# Patient Record
Sex: MALE | Race: WHITE | NOT HISPANIC OR LATINO | Employment: FULL TIME | URBAN - METROPOLITAN AREA
[De-identification: names, ages, dates, MRNs, and addresses within clinical notes are randomized per-mention and may not be internally consistent; named-entity substitution may affect disease eponyms.]

---

## 2018-06-27 ENCOUNTER — HOSPITAL ENCOUNTER (EMERGENCY)
Facility: MEDICAL CENTER | Age: 28
End: 2018-06-27
Attending: EMERGENCY MEDICINE
Payer: COMMERCIAL

## 2018-06-27 VITALS
HEIGHT: 70 IN | DIASTOLIC BLOOD PRESSURE: 58 MMHG | HEART RATE: 84 BPM | RESPIRATION RATE: 18 BRPM | SYSTOLIC BLOOD PRESSURE: 136 MMHG | WEIGHT: 149.91 LBS | TEMPERATURE: 98.4 F | BODY MASS INDEX: 21.46 KG/M2 | OXYGEN SATURATION: 99 %

## 2018-06-27 DIAGNOSIS — S01.21XA NASAL LACERATION, INITIAL ENCOUNTER: ICD-10-CM

## 2018-06-27 PROCEDURE — 304999 HCHG REPAIR-SIMPLE/INTERMED LEVEL 1

## 2018-06-27 PROCEDURE — A9270 NON-COVERED ITEM OR SERVICE: HCPCS | Performed by: EMERGENCY MEDICINE

## 2018-06-27 PROCEDURE — 303747 HCHG EXTRA SUTURE

## 2018-06-27 PROCEDURE — 99284 EMERGENCY DEPT VISIT MOD MDM: CPT

## 2018-06-27 PROCEDURE — 700111 HCHG RX REV CODE 636 W/ 250 OVERRIDE (IP)

## 2018-06-27 PROCEDURE — 700102 HCHG RX REV CODE 250 W/ 637 OVERRIDE(OP): Performed by: EMERGENCY MEDICINE

## 2018-06-27 PROCEDURE — 304217 HCHG IRRIGATION SYSTEM

## 2018-06-27 RX ORDER — CEPHALEXIN 500 MG/1
500 CAPSULE ORAL 3 TIMES DAILY
Qty: 30 CAP | Refills: 0 | Status: SHIPPED | OUTPATIENT
Start: 2018-06-27 | End: 2018-07-07

## 2018-06-27 RX ORDER — LIDOCAINE HYDROCHLORIDE 10 MG/ML
INJECTION, SOLUTION EPIDURAL; INFILTRATION; INTRACAUDAL; PERINEURAL
Status: COMPLETED
Start: 2018-06-27 | End: 2018-06-27

## 2018-06-27 RX ORDER — HYDROCODONE BITARTRATE AND ACETAMINOPHEN 5; 325 MG/1; MG/1
1 TABLET ORAL EVERY 4 HOURS PRN
Qty: 8 TAB | Refills: 0 | Status: SHIPPED | OUTPATIENT
Start: 2018-06-27 | End: 2018-06-29 | Stop reason: SDUPTHER

## 2018-06-27 RX ORDER — LIDOCAINE HYDROCHLORIDE AND EPINEPHRINE 10; 10 MG/ML; UG/ML
INJECTION, SOLUTION INFILTRATION; PERINEURAL
Status: DISCONTINUED
Start: 2018-06-27 | End: 2018-06-27 | Stop reason: HOSPADM

## 2018-06-27 RX ORDER — HYDROCODONE BITARTRATE AND ACETAMINOPHEN 5; 325 MG/1; MG/1
1 TABLET ORAL ONCE
Status: COMPLETED | OUTPATIENT
Start: 2018-06-27 | End: 2018-06-27

## 2018-06-27 RX ORDER — LIDOCAINE HYDROCHLORIDE 10 MG/ML
20 INJECTION, SOLUTION INFILTRATION; PERINEURAL ONCE
Status: COMPLETED | OUTPATIENT
Start: 2018-06-27 | End: 2018-06-27

## 2018-06-27 RX ADMIN — LIDOCAINE HYDROCHLORIDE 20 ML: 10 INJECTION, SOLUTION INFILTRATION; PERINEURAL at 11:30

## 2018-06-27 RX ADMIN — LIDOCAINE HYDROCHLORIDE 20 ML: 10 INJECTION, SOLUTION EPIDURAL; INFILTRATION; INTRACAUDAL; PERINEURAL at 11:30

## 2018-06-27 RX ADMIN — HYDROCODONE BITARTRATE AND ACETAMINOPHEN 1 TABLET: 5; 325 TABLET ORAL at 11:23

## 2018-06-27 ASSESSMENT — PAIN SCALES - GENERAL
PAINLEVEL_OUTOF10: 7
PAINLEVEL_OUTOF10: 7

## 2018-06-27 NOTE — ED PROVIDER NOTES
ED Provider Note    Scribed for Hailey Ray M.D. by Jennifer Hazel. 6/27/2018  11:15 AM    Primary care provider: Jackelin Llanos M.D.  Means of arrival: Walk-in  History obtained from: Patient  History limited by: None    CHIEF COMPLAINT  Chief Complaint   Patient presents with   • Laceration   • Facial Injury       HPI  Hesham Rich is a 28 y.o. male who presents to the Emergency Department for evaluation of laceration to left side nose. Patient sustained the injury after a garage door fell on his face. He denies loss of consciousness or any loose teeth. He states his nose does not feel broken.     REVIEW OF SYSTEMS  HEENT:  No loose teeth, facial injury  SKIN: laceration to left nose    See history of present illness. E.    PAST MEDICAL HISTORY       SURGICAL HISTORY   has a past surgical history that includes interdental fixation (7/9/08); laceration repair (7/9/08); dental surgery (7/9/08); dental extraction(s) (06/2008); and arch bar removal or repair (8/21/08).    SOCIAL HISTORY  Social History   Substance Use Topics   • Smoking status: Never Smoker   • Alcohol use No      History   Drug Use No       FAMILY HISTORY  Family History   Problem Relation Age of Onset   • Diabetes Maternal Grandfather        CURRENT MEDICATIONS    Current Facility-Administered Medications:   •  LIDOCAINE-EPINEPHRINE 1 %-1:122002 INJ SOLN, , , ,     Current Outpatient Prescriptions:   •  cephALEXin (KEFLEX) 500 MG Cap, Take 1 Cap by mouth 3 times a day for 10 days., Disp: 30 Cap, Rfl: 0  •  HYDROcodone-acetaminophen (NORCO) 5-325 MG Tab per tablet, Take 1 Tab by mouth every four hours as needed for up to 3 days., Disp: 8 Tab, Rfl: 0  •  sertraline (ZOLOFT) 50 MG TABS, Take 50 mg by mouth every day., Disp: , Rfl:   •  Eszopiclone (LUNESTA) 2 MG TABS, Take  by mouth., Disp: , Rfl:   •  erythromycin 5 MG/GM OINT, Apply to affected eyelid tid x 7 days., Disp: 1 Tube, Rfl: 0    ALLERGIES  No Known Allergies    PHYSICAL  "EXAM  VITAL SIGNS: /74   Pulse 66   Temp 36.9 °C (98.4 °F) (Temporal)   Resp 16   Ht 1.778 m (5' 10\")   Wt 68 kg (149 lb 14.6 oz)   SpO2 100%   BMI 21.51 kg/m²     Constitutional: Well developed, Well nourished, No acute distress, Non-toxic appearance.   HEENT: Normocephalic, external ears normal, pharynx pink,  Mucous  Membranes moist, No rhinorrhea or mucosal edema  Eyes: PERRL, EOMI, Conjunctiva normal, No discharge.   Neck: Normal range of motion, No tenderness, Supple, No stridor.   Cardiovascular: Regular Rate and Rhythm, No murmurs,  rubs, or gallops.   Thorax & Lungs: Lungs clear to auscultation bilaterally, No respiratory distress, No wheezes, rhales or rhonchi, No chest wall tenderness.   Abdomen: Bowel sounds normal, Soft, non tender, non distended,  No pulsatile masses., no rebound guarding or peritoneal signs.   Skin: 2.5 cm laceration to left side nose  Extremities: Equal, intact distal pulses, No cyanosis, No tenderness.   Neurologic: Alert & awake, no focal deficits      DIAGNOSTIC STUDIES / PROCEDURES    Laceration Repair Procedure Note    Indication: Laceration    Procedure: The patient was placed in the appropriate position and anesthesia around the laceration was obtained by infiltration using 1% Lidocaine without epinephrine. The area was then irrigated with normal saline. The laceration was closed with 6-0 Ethilon using interrupted sutures. There were no additional lacerations requiring repair. The wound area was then dressed with a sterile dressing.      Total repaired wound length: 2.5 cm.     Other Items: Suture count: 10    The patient tolerated the procedure well.    Complications: None      COURSE & MEDICAL DECISION MAKING  Nursing notes, VS, PMSFHx reviewed in chart.     11:15 AM - Patient seen and examined at bedside. Patient has a sutureable laceration on his left side nose. He agrees with plan to repair laceration. Patient will be treated with Norco 5-325 mg.     11:30 AM " - Laceration repair procedure performed. See above note for details. Workman's Comp will consult on patient. The patient will be discharged with Keflex, Norco and should return if symptoms worsen or if new symptoms arise. The patient understands and agrees to plan.      reviewed prescription monitoring program for patient's narcotic use before prescribing a scheduled drug.The patient will not drink alcohol nor drive with prescribed medications. The patient will return for new or worsening symptoms and is stable at the time of discharge.    In prescribing controlled substances to this patient, I certify that I have obtained and reviewed the medical history of Hesham Rich. I have also made a good lenore effort to obtain applicable records from other providers who have treated the patient and records did not demonstrate any increased risk of substance abuse that would prevent me from prescribing controlled substances.     I have conducted a physical exam and documented it. I have reviewed Mr. Rich’s prescription history as maintained by the [place] Prescription Monitoring Program.     I have assessed the patient’s risk for abuse, dependency, and addiction using the validated Opioid Risk Tool available at https://www.mdcalc.com/dxcmzd-nmdx-ackx-ort-narcotic-abuse.     Given the above, I believe the benefits of controlled substance therapy outweigh the risks. The reasons for prescribing controlled substances include non-narcotic, oral analgesic alternatives have been inadequate for pain control. Accordingly, I have discussed the risk and benefits, treatment plan, and alternative therapies with the patient.       DISPOSITION:  Patient will be discharged home in stable condition.    FOLLOW UP:  [address] [address]  [phone]    Call in 1 day  for recheck and have sutures removed in 5 days      OUTPATIENT MEDICATIONS:  Discharge Medication List as of 6/27/2018 12:50 PM       START taking these medications    Details   cephALEXin (KEFLEX) 500 MG Cap Take 1 Cap by mouth 3 times a day for 10 days., Disp-30 Cap, R-0, Print Rx Paper      HYDROcodone-acetaminophen (NORCO) 5-325 MG Tab per tablet Take 1 Tab by mouth every four hours as needed for up to 3 days., Disp-8 Tab, R-0, Print Rx Paper, For 3 days             FINAL IMPRESSION  1. Nasal laceration, initial encounter          IJennifer (Kaylaibe), am scribing for, and in the presence of, Hailey Ray M.D..    Electronically signed by: Jennifer Hazel (Kaylaibe), 6/27/2018    IHailey M.D. personally performed the services described in this documentation, as scribed by Jennifer Hazel in my presence, and it is both accurate and complete.    The note accurately reflects work and decisions made by me.  Hailey Ray  6/27/2018  1:17 PM

## 2018-06-27 NOTE — DISCHARGE INSTRUCTIONS
Laceration Care, Adult  A laceration is a cut that goes through all of the layers of the skin and into the tissue that is right under the skin. Some lacerations heal on their own. Others need to be closed with stitches (sutures), staples, skin adhesive strips, or skin glue. Proper laceration care minimizes the risk of infection and helps the laceration to heal better.  HOW TO CARE FOR YOUR LACERATION  If sutures or staples were used:  · Keep the wound clean and dry.  · If you were given a bandage (dressing), you should change it at least one time per day or as told by your health care provider. You should also change it if it becomes wet or dirty.  · Keep the wound completely dry for the first 24 hours or as told by your health care provider. After that time, you may shower or bathe. However, make sure that the wound is not soaked in water until after the sutures or staples have been removed.  · Clean the wound one time each day or as told by your health care provider:  ¨ Wash the wound with soap and water.  ¨ Rinse the wound with water to remove all soap.  ¨ Pat the wound dry with a clean towel. Do not rub the wound.  · After cleaning the wound, apply a thin layer of antibiotic ointment as told by your health care provider. This will help to prevent infection and keep the dressing from sticking to the wound.  · Have the sutures or staples removed as told by your health care provider.  If skin adhesive strips were used:  · Keep the wound clean and dry.  · If you were given a bandage (dressing), you should change it at least one time per day or as told by your health care provider. You should also change it if it becomes dirty or wet.  · Do not get the skin adhesive strips wet. You may shower or bathe, but be careful to keep the wound dry.  · If the wound gets wet, pat it dry with a clean towel. Do not rub the wound.  · Skin adhesive strips fall off on their own. You may trim the strips as the wound heals. Do not  remove skin adhesive strips that are still stuck to the wound. They will fall off in time.  If skin glue was used:  · Try to keep the wound dry, but you may briefly wet it in the shower or bath. Do not soak the wound in water, such as by swimming.  · After you have showered or bathed, gently pat the wound dry with a clean towel. Do not rub the wound.  · Do not do any activities that will make you sweat heavily until the skin glue has fallen off on its own.  · Do not apply liquid, cream, or ointment medicine to the wound while the skin glue is in place. Using those may loosen the film before the wound has healed.  · If you were given a bandage (dressing), you should change it at least one time per day or as told by your health care provider. You should also change it if it becomes dirty or wet.  · If a dressing is placed over the wound, be careful not to apply tape directly over the skin glue. Doing that may cause the glue to be pulled off before the wound has healed.  · Do not pick at the glue. The skin glue usually remains in place for 5-10 days, then it falls off of the skin.  General Instructions  · Take over-the-counter and prescription medicines only as told by your health care provider.  · If you were prescribed an antibiotic medicine or ointment, take or apply it as told by your doctor. Do not stop using it even if your condition improves.  · To help prevent scarring, make sure to cover your wound with sunscreen whenever you are outside after stitches are removed, after adhesive strips are removed, or when glue remains in place and the wound is healed. Make sure to wear a sunscreen of at least 30 SPF.  · Do not scratch or pick at the wound.  · Keep all follow-up visits as told by your health care provider. This is important.  · Check your wound every day for signs of infection. Watch for:  ¨ Redness, swelling, or pain.  ¨ Fluid, blood, or pus.  · Raise (elevate) the injured area above the level of your heart  while you are sitting or lying down, if possible.  SEEK MEDICAL CARE IF:  · You received a tetanus shot and you have swelling, severe pain, redness, or bleeding at the injection site.  · You have a fever.  · A wound that was closed breaks open.  · You notice a bad smell coming from your wound or your dressing.  · You notice something coming out of the wound, such as wood or glass.  · Your pain is not controlled with medicine.  · You have increased redness, swelling, or pain at the site of your wound.  · You have fluid, blood, or pus coming from your wound.  · You notice a change in the color of your skin near your wound.  · You need to change the dressing frequently due to fluid, blood, or pus draining from the wound.  · You develop a new rash.  · You develop numbness around the wound.  SEEK IMMEDIATE MEDICAL CARE IF:  · You develop severe swelling around the wound.  · Your pain suddenly increases and is severe.  · You develop painful lumps near the wound or on skin that is anywhere on your body.  · You have a red streak going away from your wound.  · The wound is on your hand or foot and you cannot properly move a finger or toe.  · The wound is on your hand or foot and you notice that your fingers or toes look pale or bluish.     This information is not intended to replace advice given to you by your health care provider. Make sure you discuss any questions you have with your health care provider.     Document Released: 12/18/2006 Document Revised: 05/03/2016 Document Reviewed: 12/14/2015  [employer] Interactive Patient Education ©2016 [employer]      Facial Laceration  A facial laceration is a cut on the face. These injuries can be painful and cause bleeding. Some cuts may need to be closed with stitches (sutures), skin adhesive strips, or wound glue. Cuts usually heal quickly but can leave a scar. It can take 1-2 years for the scar to go away completely.  Follow these instructions at home:  · Only take medicines  as told by your doctor.  · Follow your doctor's instructions for wound care.  For Stitches:  · Keep the cut clean and dry.  · If you have a bandage (dressing), change it at least once a day. Change the bandage if it gets wet or dirty, or as told by your doctor.  · Wash the cut with soap and water 2 times a day. Rinse the cut with water. Pat it dry with a clean towel.  · Put a thin layer of medicated cream on the cut as told by your doctor.  · You may shower after the first 24 hours. Do not soak the cut in water until the stitches are removed.  · Have your stitches removed as told by your doctor.  · Do not wear any makeup until a few days after your stitches are removed.  For Skin Adhesive Strips:  · Keep the cut clean and dry.  · Do not get the strips wet. You may take a bath, but be careful to keep the cut dry.  · If the cut gets wet, pat it dry with a clean towel.  · The strips will fall off on their own. Do not remove the strips that are still stuck to the cut.  For Wound Glue:  · You may shower or take baths. Do not soak or scrub the cut. Do not swim. Avoid heavy sweating until the glue falls off on its own. After a shower or bath, pat the cut dry with a clean towel.  · Do not put medicine or makeup on your cut until the glue falls off.  · If you have a bandage, do not put tape over the glue.  · Avoid lots of sunlight or tanning lamps until the glue falls off.  · The glue will fall off on its own in 5-10 days. Do not pick at the glue.  After Healing:  · Put sunscreen on the cut for the first year to reduce your scar.  Contact a doctor if:  · You have a fever.  Get help right away if:  · Your cut area gets red, painful, or puffy (swollen).  · You see a yellowish-white fluid (pus) coming from the cut.  This information is not intended to replace advice given to you by your health care provider. Make sure you discuss any questions you have with your health care provider.  Document Released: 06/05/2009 Document  Revised: 05/25/2017 Document Reviewed: 07/31/2014  [employer] Interactive Patient Education © 2017 [employer]

## 2018-06-27 NOTE — ED TRIAGE NOTES
Pt c/o laceration to left side of nose, pt states he was up on ladder working on garage door, door fell on pt's face, pt denies LOC, states some pain in mouth, but teeth are intact, pt states he noticed intermittent blurry vision in left eye, pt states this is work injury, pt awake, alert, appropriate

## 2018-06-27 NOTE — LETTER
"  FORM C-4:  EMPLOYEE’S CLAIM FOR COMPENSATION/ REPORT OF INITIAL TREATMENT  EMPLOYEE’S CLAIM - PROVIDE ALL INFORMATION REQUESTED   First Name  Hesham Rg Last Name  Hilario Birthdate             Age  1990 28 y.o. 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 (if applicable)  Removing garage door    How did this injury or occupational disease occur? Be specific and answer in detail. Use additional sheet if necessary)  was removing door from brackets. door fell out of bracket and onto my face   If you believe that you have an occupational disease, when did you first have knowledge of the disability and it relationship to your employment?  n/a Witnesses to the Accident  n/a     Nature of Injury or Occupational Disease  Workers' Compensation  Part(s) of Body Injured or Affected  Soft Tissue, Facial Bones, N/A    I certify that the above is true and correct to the best of my knowledge and that I have provided this information in order to obtain the benefits of " [place] or [place]).  I hereby authorize any physician, chiropractor, surgeon, practitioner, or other person, any hospital, including [facility] or [facility], any medical service organization, any insurance company, or other institution or organization to release to each other, any medical or other information, including benefits paid or payable, pertinent to this injury or disease, except information relative to diagnosis, treatment and/or counseling for AIDS, psychological conditions, alcohol or controlled substances, for which I must give specific authorization.  A Photostat of this authorization shall be as valid as the original.   Date 06/27/2018 Place  [facility] Employee’s Signature   THIS REPORT MUST BE COMPLETED AND MAILED WITHIN 3 WORKING DAYS OF TREATMENT   Place  [facility], EMERGENCY DEPT  Name of Facility   [facility]   Date  6/27/2018 Diagnosis  (S01.21XA) Nasal laceration, initial encounter Is there evidence the injured employee was under the influence of alcohol and/or another controlled substance at the time of accident?   Hour  12:17 PM Description of Injury or Disease  Nasal laceration, initial encounter No   Treatment  Laceration repair  Have you advised the patient to remain off work five days or more?         No   X-Ray Findings    Comments:na   If Yes   From Date  6/27/2018 To Date  6/29/2018   From information given by the employee, together with medical evidence, can you directly connect this injury or occupational disease as job incurred?  Yes If No, is the employee capable of: Full Duty  No Modified Duty  Yes   Is additional medical care by a physician indicated?  Yes  Comments:workmans comp If Modified Duty, Specify any Limitations / Restrictions  No heavy exertion     Do you know of any previous injury or disease contributing to this  "condition or occupational disease?  No   Date  6/27/2018 Print Doctor’s Name  BrittonHector romeo I certify the employer’s copy of this form was mailed on:   Address  [address] [address] [phone] Insurer’s Use Only   [facility]  [address]    Provider’s Tax ID Number    Telephone  Dept: [phone]    Doctor’s Signature  e-HECTOR Jaffe M.D. Degree   MD    Original - TREATING PHYSICIAN OR CHIROPRACTOR   Pg 2-Insurer/TPA   Pg 3-Employer   Pg 4-Employee                                                                                                  Form C-4 (rev01/03)     BRIEF DESCRIPTION OF RIGHTS AND BENEFITS  (Pursuant to [place] 616C.050)    Notice of Injury or Occupational Disease (Incident Report Form C-1): If an injury or occupational disease (OD) arises out of and in the course of employment, you must provide written notice to your employer as soon as practicable, but no later than 7 days after the accident or OD. Your employer shall maintain a sufficient supply of the required forms.  Claim for Compensation (Form C-4): If medical treatment is sought, the form C-4 is available at the place of initial treatment. A completed \"Claim for Compensation\" (Form C-4) must be filed within 90 days after an accident or OD. The treating physician or chiropractor must, within 3 working days after treatment, complete and mail to the employer, the employer's insurer and third-party , the Claim for Compensation.  Medical Treatment: If you require medical treatment for your on-the-job injury or OD, you may be required to select a physician or chiropractor from a list provided by your workers’ compensation insurer, if it has contracted with an Organization for Managed Care (MCO) or Preferred Provider Organization (PPO) or providers of health care. If your employer has not entered into a contract with an MCO or PPO, you may select a physician or chiropractor from the Panel of " Physicians and Chiropractors. Any medical costs related to your industrial injury or OD will be paid by your insurer.  Temporary Total Disability (TTD): If your doctor has certified that you are unable to work for a period of at least 5 consecutive days, or 5 cumulative days in a 20-day period, or places restrictions on you that your employer does not accommodate, you may be entitled to TTD compensation.  Temporary Partial Disability (TPD): If the wage you receive upon reemployment is less than the compensation for TTD to which you are entitled, the insurer may be required to pay you TPD compensation to make up the difference. TPD can only be paid for a maximum of 24 months.  Permanent Partial Disability (PPD): When your medical condition is stable and there is an indication of a PPD as a result of your injury or OD, within 30 days, your insurer must arrange for an evaluation by a rating physician or chiropractor to determine the degree of your PPD. The amount of your PPD award depends on the date of injury, the results of the PPD evaluation and your age and wage.  Permanent Total Disability (PTD): If you are medically certified by a treating physician or chiropractor as permanently and totally disabled and have been granted a PTD status by your insurer, you are entitled to receive monthly benefits not to exceed 66 2/3% of your average monthly wage. The amount of your PTD payments is subject to reduction if you previously received a PPD award.  Vocational Rehabilitation Services: You may be eligible for vocational rehabilitation services if you are unable to return to the job due to a permanent physical impairment or permanent restrictions as a result of your injury or occupational disease.  Transportation and Per Yumiko Reimbursement: You may be eligible for travel expenses and per yumiko associated with medical treatment.  Reopening: You may be able to reopen your claim if your condition worsens after claim  closure.  Appeal Process: If you disagree with a written determination issued by the insurer or the insurer does not respond to your request, you may appeal to the Department of Administration, , by following the instructions contained in your determination letter. You must appeal the determination within 70 days from the date of the determination letter at [address], [address], [place], [place] [address], or [address], [address], [place], [place] [address]. If you disagree with the  decision, you may appeal to the Department of Administration, . You must file your appeal within 30 days from the date of the  decision letter at [address], [address], [place], [place] [address], or [address], [place], [place] [address]. If you disagree with a decision of an , you may file a petition for judicial review with the District Court. You must do so within 30 days of the Appeal Officer’s decision. You may be represented by an  at your own expense or you may contact the [facility] for possible representation.  [place]  for Injured Workers (NAIW): If you disagree with a  decision, you may request that NAIW represent you without charge at an  Hearing. For information regarding denial of benefits, you may contact the [facility] at: [address], [address], [place] [address], [phone]) [phone], or [address], [address], [place] [address], [phone]  To File a Complaint with the Division: If you wish to file a complaint with the  of the Division of Industrial Relations (DIR), please contact the Workers’ Compensation Section, [address], [address], [place], [place] [address], telephone [phone]) [phone], or [address], [place] [address], telephone [phone].  For assistance with Workers’  Compensation Issues: you may contact the Office of the Governor Consumer Health Assistance, [address] [facility] [address], [address], [place], [place] [address], Toll Free 1-307.383.6193, Web site: http://govcha.[facility].nv., E-mail [email or web address].[email or web address].                                                                                                                                                                               __________________________________________________________________                                    06/27/2018            Employee Name / Signature                                                                                                                            Date                                       D-2 (rev. 10/07)

## 2018-06-27 NOTE — ED NOTES
Patient received discharge instructions, verbalized understanding and intent to follow. Denied any additional questions or concerns. Stable and ambulatory to discharge with steady gait. Belongings with patient at time of discharge.   Wife and daughter at bedside for discharge instructions.

## 2018-06-29 ENCOUNTER — HOSPITAL ENCOUNTER (OUTPATIENT)
Dept: RADIOLOGY | Facility: MEDICAL CENTER | Age: 28
End: 2018-06-29
Attending: FAMILY MEDICINE
Payer: COMMERCIAL

## 2018-06-29 ENCOUNTER — OCCUPATIONAL MEDICINE (OUTPATIENT)
Dept: URGENT CARE | Facility: PHYSICIAN GROUP | Age: 28
End: 2018-06-29
Payer: COMMERCIAL

## 2018-06-29 VITALS
RESPIRATION RATE: 14 BRPM | TEMPERATURE: 99.1 F | WEIGHT: 165 LBS | HEIGHT: 70 IN | SYSTOLIC BLOOD PRESSURE: 122 MMHG | BODY MASS INDEX: 23.62 KG/M2 | HEART RATE: 76 BPM | DIASTOLIC BLOOD PRESSURE: 84 MMHG | OXYGEN SATURATION: 98 %

## 2018-06-29 DIAGNOSIS — S01.21XS NASAL LACERATION, SEQUELA: ICD-10-CM

## 2018-06-29 DIAGNOSIS — S01.21XA LACERATION OF NOSE, INITIAL ENCOUNTER: ICD-10-CM

## 2018-06-29 DIAGNOSIS — S02.2XXA CLOSED FRACTURE OF NASAL BONE, INITIAL ENCOUNTER: ICD-10-CM

## 2018-06-29 PROCEDURE — 70160 X-RAY EXAM OF NASAL BONES: CPT

## 2018-06-29 PROCEDURE — 99213 OFFICE O/P EST LOW 20 MIN: CPT | Performed by: FAMILY MEDICINE

## 2018-06-29 RX ORDER — HYDROCODONE BITARTRATE AND ACETAMINOPHEN 5; 325 MG/1; MG/1
1 TABLET ORAL EVERY 4 HOURS PRN
Qty: 10 TAB | Refills: 0 | Status: SHIPPED | OUTPATIENT
Start: 2018-06-29 | End: 2018-07-06

## 2018-06-29 ASSESSMENT — PAIN SCALES - GENERAL: PAINLEVEL: 7=MODERATE-SEVERE PAIN

## 2018-06-29 NOTE — LETTER
[facility]  [address]  [place] [address]  Phone:  [phone] - Fax:  [phone]   [facility] Progress Report and Disability Certification  Date of Service: 6/29/2018   No Show:  No  Date / Time of Next Visit:     Claim Information   Patient Name: Hesham Rich  Claim Number:     Employer:   *** Date of Injury: 6/27/2018     Insurer / TPA: [employer] *** ID / SSN:     Occupation: Technician *** Diagnosis: The encounter diagnosis was Laceration of nose, initial encounter.    Medical Information   Related to Industrial Injury? Yes ***   Subjective Complaints:    DOI: 6/27    Status post nasal laceration repair done in emergency department, after a garage door fell on him at work.         He comes in today for re-evaluation due to increased pain over nose.   Describes pain as constant, throbbing, and tender to the touch.   Pain slightly improved with norco.   Currently on keflex for infection prevention.   He denies any fever.      Objective Findings: Nose - nasal mucosa swollen on right.   Laceration over right nare is clean, dry and with sutures intact.   There is some edema over bridge of nose, but no signs of wound infection -   No surrounding erythema or increased warmth.   Pre-Existing Condition(s):     Assessment:   Condition Worsened    Status: Discharged / Care Transfer  Permanent Disability:No    Plan: Medication    Diagnostics: X-ray  Comments:xray shows probable nasal bone fracture    Comments:       Disability Information   Status: Released to Full Duty    From:     Through:   Restrictions are:     Physical Restrictions   Sitting:    Standing:    Stooping:    Bending:      Squatting:    Walking:    Climbing:    Pushing:      Pulling:    Other:    Reaching Above Shoulder (L):   Reaching Above Shoulder (R):       Reaching Below Shoulder (L):    Reaching Below Shoulder (R):      Not to exceed Weight Limits   Carrying(hrs):   Weight Limit(lb):    Lifting(hrs):   Weight  Limit(lb):     Comments:   Nasal laceration  -s/p repair, now presents with increased pain    Xray was personally reviewed.   Likely small nasal bone fracture and deviated septum.     Will transfer care to ENT                      Repetitive Actions   Hands: i.e. Fine Manipulations from Grasping:     Feet: i.e. Operating Foot Controls:     Driving / Operate Machinery:     Physician Name: Viet Luna M.D. Physician Signature: VIET Khalil M.D. e-Signature: Dr. Storm Darby, Medical Director   Clinic Name / Location: [facility] [address] [address] Clinic Phone Number: Dept: [phone]   Appointment Time: 11:05 Am Visit Start Time: 11:11 AM   Check-In Time:  11:02 Am Visit Discharge Time:  ***   Original-Treating Physician or Chiropractor    Page 2-Insurer/TPA    Page 3-Employer    Page 4-Employee

## 2018-06-29 NOTE — PROGRESS NOTES
"Chief Complaint   Patient presents with   • Facial Injury     W/C facial injury HAVING SHARP PAIN          HPI:      DOI: 6/27    Status post nasal laceration repair done in emergency department, after a garage door fell on him at work.         He comes in today for re-evaluation due to increased pain over nose.   Describes pain as constant, throbbing, and tender to the touch.   Pain slightly improved with norco.   Currently on keflex for infection prevention.   He denies any fever.       Past medical history was unremarkable and not pertinent to current issue  Social hx - denies tobacco, alcohol, drug use  Family hx was reviewed - no pertinent past family hx        Review of Systems:  Review of Systems   Constitutional: Negative for fever.   HENT: no neck pain, headache or dizziness  Eyes: denies vision changes     Skin: no itching or rash  Neurological: No numbness or tingling.         OBJECTIVE  Blood pressure 122/84, pulse 76, temperature 37.3 °C (99.1 °F), resp. rate 14, height 1.778 m (5' 10\"), weight 74.8 kg (165 lb), SpO2 98 %.    HEENT - PERRLA, EOMI  Nose - nasal mucosa swollen on right.   Laceration over right nare is clean, dry and with sutures intact.   There is some edema over bridge of nose, but no signs of wound infection -   No surrounding erythema or increased warmth.     Neuro - alert and oriented x3. CN 2-12 grossly intact.        6/29/2018 12:44 PM    HISTORY/REASON FOR EXAM:  Trauma.  Laceration    TECHNIQUE/EXAM DESCRIPTION AND NUMBER OF VIEWS:  3 views of the nasal bones.    COMPARISON: None    FINDINGS:  There is minimal leftward deviation of the nasal septum. There is slight contour deformity of the nasal bones with a linear lucency where a nondisplaced fracture is not excluded. No significant soft tissue swelling is seen. Mastoid air cells are aerated.   Orbital rims appear maintained. Nasal spine of the maxilla is intact.   Impression       Slight contour deformity of the nasal bones with " a linear lucency where a nondisplaced fracture is not excluded.    Minimal leftward deviation of the nasal septum.   Reading Provider Reading Date   Adriana Montez M.D. Jun 29, 2018         A/P:         Nasal laceration  -s/p repair, now presents with increased pain    Xray was personally reviewed.   Likely small nasal bone fracture and deviated septum.     Will transfer care to ENT    - HYDROcodone-acetaminophen (NORCO) 5-325 MG Tab per tablet; Take 1 Tab by mouth every four hours as needed for up to 7 days.  Dispense: 10 Tab; Refill: 0

## 2018-06-29 NOTE — LETTER
[facility] [place]  [address] [facility] [address] [place] [address]  Phone:  [phone] - Fax:  [phone]   Occ[facility] Progress Report and Disability Certification  Date of Service: 6/29/2018   No Show:  No  Date / Time of Next Visit:     Claim Information   Patient Name: Hesham Rich  Claim Number:     Employer:   [employer] Date of Injury: 6/27/2018     Insurer / TPA: [employer]  ID / SSN:     Occupation: Technician  Diagnosis: Diagnoses of Closed fracture of nasal bone, initial encounter and Nasal laceration, sequela were pertinent to this visit.    Medical Information   Related to Industrial Injury? Yes    Subjective Complaints:    DOI: 6/27    Status post nasal laceration repair done in emergency department, after a garage door fell on him at work.         He comes in today for re-evaluation due to increased pain over nose.   Describes pain as constant, throbbing, and tender to the touch.   Pain slightly improved with norco.   Currently on keflex for infection prevention.   He denies any fever.      Objective Findings: Nose - nasal mucosa swollen on right.   Laceration over right nare is clean, dry and with sutures intact.   There is some edema over bridge of nose, but no signs of wound infection -   No surrounding erythema or increased warmth.   Pre-Existing Condition(s):     Assessment:   Condition Worsened    Status: Discharged / Care Transfer  Permanent Disability:No    Plan: Medication    Diagnostics: X-ray  Comments:xray shows probable nasal bone fracture    Comments:       Disability Information   Status: Released to Full Duty    From:     Through:   Restrictions are:     Physical Restrictions   Sitting:    Standing:    Stooping:    Bending:      Squatting:    Walking:    Climbing:    Pushing:      Pulling:    Other:    Reaching Above Shoulder (L):   Reaching Above Shoulder (R):       Reaching Below Shoulder (L):    Reaching Below Shoulder (R):      Not  to exceed Weight Limits   Carrying(hrs):   Weight Limit(lb):   Lifting(hrs):   Weight  Limit(lb):     Comments:    Nasal laceration  -s/p repair, now presents with increased pain    Xray was personally reviewed.   Likely small nasal bone fracture and deviated septum.     Will transfer care to ENT    - HYDROcodone-acetaminophen (NORCO) 5-325 MG Tab per tablet; Take 1 Tab by mouth every four hours as needed for up to 7 days.  Dispense: 10 Tab; Refill: 0             Repetitive Actions   Hands: i.e. Fine Manipulations from Grasping:     Feet: i.e. Operating Foot Controls:     Driving / Operate Machinery:     Physician Name: Viet Luna M.D. Physician Signature: VIET Khalil M.D. e-Signature: Dr. Storm Darby, Medical Director   Clinic Name / Location: [address] [address] Clinic Phone Number: Dept: [phone]   Appointment Time: 11:05 Am Visit Start Time: 11:11 AM   Check-In Time:  11:02 Am Visit Discharge Time:  1:45PM   Original-Treating Physician or Chiropractor    Page 2-Insurer/TPA    Page 3-Employer    Page 4-Employee

## 2018-07-02 ENCOUNTER — OFFICE VISIT (OUTPATIENT)
Dept: URGENT CARE | Facility: PHYSICIAN GROUP | Age: 28
End: 2018-07-02
Payer: COMMERCIAL

## 2018-07-02 VITALS
HEART RATE: 68 BPM | DIASTOLIC BLOOD PRESSURE: 80 MMHG | SYSTOLIC BLOOD PRESSURE: 130 MMHG | OXYGEN SATURATION: 95 % | HEIGHT: 70 IN | RESPIRATION RATE: 16 BRPM | TEMPERATURE: 97.4 F | BODY MASS INDEX: 23.62 KG/M2 | WEIGHT: 165 LBS

## 2018-07-02 DIAGNOSIS — S01.21XD LACERATION OF NOSE, SUBSEQUENT ENCOUNTER: ICD-10-CM

## 2018-07-02 PROCEDURE — 99212 OFFICE O/P EST SF 10 MIN: CPT | Performed by: FAMILY MEDICINE

## 2018-07-02 RX ORDER — IBUPROFEN 200 MG
200 TABLET ORAL EVERY 6 HOURS PRN
COMMUNITY
End: 2023-03-29

## 2018-07-02 ASSESSMENT — PAIN SCALES - GENERAL: PAINLEVEL: NO PAIN

## 2018-07-02 NOTE — LETTER
[address]  [place] [address]  Phone:  [phone] - Fax:  [phone]   [facility] Progress Report and Disability Certification  Date of Service: 7/2/2018   No Show:  No  Date / Time of Next Visit:     Claim Information   Patient Name: Hesham Rich  Claim Number:     Employer: OTHER  Date of Injury:      Insurer / TPA: Miscellaneous  ID / SSN:     Occupation:   Diagnosis: The encounter diagnosis was Laceration of nose, subsequent encounter.    Medical Information   Related to Industrial Injury? Yes    Subjective Complaints:  DOI: 6/27 Status post nasal laceration repair done in emergency department, after a garage door fell on him at work.   FU 7/2/2018 well healing laceration with improvement in pain.  Patient denies swelling, numbness.   Objective Findings: Well-healing laceration with sutures embedded into scab.  No erythema, swelling.    Pre-Existing Condition(s):     Assessment:   Condition Improved    Status: Additional Care Required  Permanent Disability:No    Plan:      Diagnostics:      Comments:       Disability Information   Status: Released to Full Duty    From:     Through:   Restrictions are:     Physical Restrictions   Sitting:    Standing:    Stooping:    Bending:      Squatting:    Walking:    Climbing:    Pushing:      Pulling:    Other:    Reaching Above Shoulder (L):   Reaching Above Shoulder (R):       Reaching Below Shoulder (L):    Reaching Below Shoulder (R):      Not to exceed Weight Limits   Carrying(hrs):   Weight Limit(lb):   Lifting(hrs):   Weight  Limit(lb):     Comments:      Repetitive Actions   Hands: i.e. Fine Manipulations from Grasping:     Feet: i.e. Operating Foot Controls:     Driving / Operate Machinery:     Physician Name: [facility] Physician Signature: ALVARADO Wilson M.D. e-Signature: Dr. Storm Darby, Medical Director   Clinic Name / Location: [place] [address]  [place] [address] Clinic Phone Number: Dept: [phone]   Appointment Time: 4:30 Pm Visit Start Time: 4:30 PM   Check-In Time:  4:25 Pm Visit Discharge Time:  5:15PM   Original-Treating Physician or Chiropractor    Page 2-Insurer/TPA    Page 3-Employer    Page 4-Employee

## 2018-07-05 ENCOUNTER — OCCUPATIONAL MEDICINE (OUTPATIENT)
Dept: URGENT CARE | Facility: PHYSICIAN GROUP | Age: 28
End: 2018-07-05
Payer: COMMERCIAL

## 2018-07-05 VITALS
RESPIRATION RATE: 12 BRPM | BODY MASS INDEX: 23.62 KG/M2 | OXYGEN SATURATION: 97 % | WEIGHT: 165 LBS | HEIGHT: 70 IN | DIASTOLIC BLOOD PRESSURE: 62 MMHG | HEART RATE: 67 BPM | TEMPERATURE: 98.4 F | SYSTOLIC BLOOD PRESSURE: 110 MMHG

## 2018-07-05 DIAGNOSIS — S01.21XD LACERATION OF NOSE, SUBSEQUENT ENCOUNTER: ICD-10-CM

## 2018-07-05 PROCEDURE — 99213 OFFICE O/P EST LOW 20 MIN: CPT | Performed by: FAMILY MEDICINE

## 2018-07-05 NOTE — LETTER
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    Medical Information   Related to Industrial Injury? Yes    Subjective Complaints:  DOI: 6/27 Status post nasal laceration repair done in emergency department, after a garage door fell on him at work  FU 7/5/2018: Improvement in pain. No redness or swelling.   Objective Findings: Well-healing laceration with sutures intact.   Pre-Existing Condition(s):     Assessment:   Condition Improved    Status: Discharged /  MMI  Permanent Disability:No    Plan:      Diagnostics:      Comments:       Disability Information   Status: Released to Full Duty    From:     Through:   Restrictions are:     Physical Restrictions   Sitting:    Standing:    Stooping:    Bending:      Squatting:    Walking:    Climbing:    Pushing:      Pulling:    Other:    Reaching Above Shoulder (L):   Reaching Above Shoulder (R):       Reaching Below Shoulder (L):    Reaching Below Shoulder (R):      Not to exceed Weight Limits   Carrying(hrs):   Weight Limit(lb):   Lifting(hrs):   Weight  Limit(lb):     Comments:      Repetitive Actions   Hands: i.e. Fine Manipulations from Grasping:     Feet: i.e. Operating Foot Controls:     Driving / Operate Machinery:     Physician Name: Vinicius Bill M.D. Physician Signature: VINICIUS Wilson M.D. e-Signature: Dr. Storm Darby, Medical Director   Clinic Name / Location: [facility] [place]  [address] [place] [address] Clinic Phone  Number: Dept: [phone]   Appointment Time: 8:10 Am Visit Start Time: 8:16 AM   Check-In Time:  8:06 Am Visit Discharge Time:  9:31 AM   Original-Treating Physician or Chiropractor    Page 2-Insurer/TPA    Page 3-Employer    Page 4-Employee

## 2018-07-05 NOTE — PATIENT INSTRUCTIONS
Suture Removal, Care After  Refer to this sheet in the next few weeks. These instructions provide you with information on caring for yourself after your procedure. Your health care provider may also give you more specific instructions. Your treatment has been planned according to current medical practices, but problems sometimes occur. Call your health care provider if you have any problems or questions after your procedure.  WHAT TO EXPECT AFTER THE PROCEDURE  After your stitches (sutures) are removed, it is typical to have the following:  · Some discomfort and swelling in the wound area.  · Slight redness in the area.  HOME CARE INSTRUCTIONS   · If you have skin adhesive strips over the wound area, do not take the strips off. They will fall off on their own in a few days. If the strips remain in place after 14 days, you may remove them.  · Change any bandages (dressings) at least once a day or as directed by your health care provider. If the bandage sticks, soak it off with warm, soapy water.  · Apply cream or ointment only as directed by your health care provider. If using cream or ointment, wash the area with soap and water 2 times a day to remove all the cream or ointment. Rinse off the soap and pat the area dry with a clean towel.  · Keep the wound area dry and clean. If the bandage becomes wet or dirty, or if it develops a bad smell, change it as soon as possible.  · Continue to protect the wound from injury.  · Use sunscreen when out in the sun. New scars become sunburned easily.  SEEK MEDICAL CARE IF:  · You have increasing redness, swelling, or pain in the wound.  · You see pus coming from the wound.  · You have a fever.  · You notice a bad smell coming from the wound or dressing.  · Your wound breaks open (edges not staying together).     This information is not intended to replace advice given to you by your health care provider. Make sure you discuss any questions you have with your health care  provider.     Document Released: 09/12/2002 Document Revised: 10/08/2014 Document Reviewed: 07/30/2014  [employer] Interactive Patient Education ©2016 [employer]

## 2018-07-05 NOTE — PROGRESS NOTES
"Subjective:   Hesham Rich is a 28 y.o. male who presents for Wound Check (stitches x5days )    DOI: 6/27 Status post nasal laceration repair done in emergency department, after a garage door fell on him at work.   FU 7/2/2018 well healing laceration with improvement in pain.  Patient denies swelling, numbness.HPI  ROS   Objective:   /80   Pulse 68   Temp 36.3 °C (97.4 °F)   Resp 16   Ht 1.778 m (5' 10\")   Wt 74.8 kg (165 lb)   SpO2 95%   BMI 23.68 kg/m²   Physical Exam   Constitutional: He is oriented to person, place, and time. He appears well-developed and well-nourished. No distress.   HENT:   Head: Normocephalic and atraumatic.   Eyes: Conjunctivae are normal. Pupils are equal, round, and reactive to light.   Cardiovascular: Normal rate and regular rhythm.    Pulmonary/Chest: Effort normal and breath sounds normal.   Neurological: He is alert and oriented to person, place, and time.   Skin: Skin is warm and dry.   Psychiatric: He has a normal mood and affect. Thought content normal.   Vitals reviewed.    Well-healing laceration with sutures embedded into scab.  No erythema, swelling.   Assessment/Plan:   1. Laceration of nose, subsequent encounter    Other orders  - ibuprofen (MOTRIN) 200 MG Tab; Take 200 mg by mouth every 6 hours as needed.  RTC 3 days for suture removal  Differential diagnosis, natural history, supportive care, and indications for immediate follow-up discussed.    "

## 2018-07-05 NOTE — PROGRESS NOTES
"Subjective:   Hesham Rich is a 28 y.o. male who presents for Suture / Staple Removal    DOI: 6/27 Status post nasal laceration repair done in emergency department, after a garage door fell on him at work  FU 7/5/2018: Improvement in pain. No redness or swelling.Suture / Staple Removal   The sutures were placed 7 to 10 days ago. There has been no drainage from the wound. There is no redness present. There is no swelling present. There is no pain present.     ROS   Objective:   /62   Pulse 67   Temp 36.9 °C (98.4 °F)   Resp 12   Ht 1.778 m (5' 10\")   Wt 74.8 kg (165 lb)   SpO2 97%   BMI 23.68 kg/m²   Physical Exam   Constitutional: He is oriented to person, place, and time. He appears well-developed and well-nourished. No distress.   HENT:   Head: Normocephalic and atraumatic.   Eyes: Conjunctivae are normal. Pupils are equal, round, and reactive to light.   Cardiovascular: Normal rate and regular rhythm.    Pulmonary/Chest: Effort normal and breath sounds normal.   Neurological: He is alert and oriented to person, place, and time.   Skin: Skin is warm and dry.   Psychiatric: He has a normal mood and affect. Thought content normal.   Vitals reviewed.    Well-healing laceration with sutures intact.  Assessment/Plan:   1. Laceration of nose, subsequent encounter    Differential diagnosis, natural history, supportive care, and indications for immediate follow-up discussed.  Sutures removed without complication.   "

## 2023-03-29 ENCOUNTER — OFFICE VISIT (OUTPATIENT)
Dept: URGENT CARE | Facility: PHYSICIAN GROUP | Age: 33
End: 2023-03-29
Payer: COMMERCIAL

## 2023-03-29 VITALS
RESPIRATION RATE: 14 BRPM | WEIGHT: 155 LBS | HEART RATE: 82 BPM | SYSTOLIC BLOOD PRESSURE: 106 MMHG | TEMPERATURE: 97.8 F | OXYGEN SATURATION: 96 % | BODY MASS INDEX: 22.19 KG/M2 | HEIGHT: 70 IN | DIASTOLIC BLOOD PRESSURE: 72 MMHG

## 2023-03-29 DIAGNOSIS — B34.9 VIRAL ILLNESS: ICD-10-CM

## 2023-03-29 PROCEDURE — 99203 OFFICE O/P NEW LOW 30 MIN: CPT | Performed by: REGISTERED NURSE

## 2023-03-29 ASSESSMENT — ENCOUNTER SYMPTOMS
EYE PAIN: 0
HEADACHES: 0
FEVER: 0
DIZZINESS: 0
EYE DISCHARGE: 0
ABDOMINAL PAIN: 0
LOSS OF CONSCIOUSNESS: 0
PALPITATIONS: 0

## 2023-03-29 NOTE — PROGRESS NOTES
Chief Complaint   Patient presents with    Cough     Ear pain,nasal issues,eyes red,body aches x 5 days     HPI:   Patient is a 33 y.o. male who is presenting for 5 days of fever (tmax 101, since resolved) body aches, fatigue, cough that is worse at night and in morning, nausea with a few episodes of vomiting. Over all is feeling better. Using OTC cold medications which are helping. Was exposed to wife and kid who had similar symptoms, but theres didn't last as long. No underlying medical conditions. No COVID vaccinations. No bloody or coffee ground emesis. Denies bloody or black tarry or mucousy stools. Tolerating PO    No Known Allergies     No current Epic-ordered outpatient medications on file.     No current Epic-ordered facility-administered medications on file.     Pertinent history: Exposure to family members with similar symptoms    Social History     Tobacco Use    Smoking status: Never    Smokeless tobacco: Current     Types: Chew   Substance Use Topics    Alcohol use: No    Drug use: No     Review of Systems   Constitutional:  Negative for fever and malaise/fatigue.   Eyes:  Negative for pain and discharge.   Cardiovascular:  Negative for chest pain, palpitations and leg swelling.   Gastrointestinal:  Negative for abdominal pain.   Genitourinary:  Negative for dysuria.   Skin:  Negative for rash.   Neurological:  Negative for dizziness, loss of consciousness and headaches.      Vitals:    03/29/23 1150   BP: 106/72   Pulse: 82   Resp: 14   Temp: 36.6 °C (97.8 °F)   SpO2: 96%     Physical Exam  Vitals and nursing note reviewed.   Constitutional:       General: He is not in acute distress.     Appearance: Normal appearance. He is well-developed. He is not ill-appearing, toxic-appearing or diaphoretic.   HENT:      Head: Normocephalic and atraumatic.      Right Ear: Tympanic membrane normal.      Left Ear: Tympanic membrane normal.      Nose: Nose normal. No congestion or rhinorrhea.      Mouth/Throat:       Mouth: Mucous membranes are moist.      Pharynx: Oropharynx is clear. No oropharyngeal exudate or posterior oropharyngeal erythema.   Eyes:      General:         Right eye: No discharge.         Left eye: No discharge.      Conjunctiva/sclera: Conjunctivae normal.   Cardiovascular:      Rate and Rhythm: Normal rate and regular rhythm.      Pulses: Normal pulses.      Heart sounds: Normal heart sounds. No murmur heard.  Pulmonary:      Effort: Pulmonary effort is normal. No respiratory distress.      Breath sounds: Normal breath sounds. No wheezing, rhonchi or rales.   Chest:      Chest wall: No tenderness.   Musculoskeletal:         General: No swelling or tenderness.      Cervical back: Normal range of motion and neck supple.      Right lower leg: No edema.      Left lower leg: No edema.   Lymphadenopathy:      Cervical: No cervical adenopathy.   Skin:     General: Skin is warm and dry.   Neurological:      General: No focal deficit present.      Mental Status: He is alert and oriented to person, place, and time.   Psychiatric:         Mood and Affect: Mood normal.     Assessment/Plan:  1. Viral illness        Vital signs are stable.  No adventitious lung sounds, unremarkable abdominal exam.  Symptoms are improving and he does not have any red flag signs or symptoms.  Was exposed to family who had similar symptoms and have since resolved.  Discussed bland diet.  OTC cold medication.  Adequate hydration.  Deep breathing and coughing.  Monitor symptoms.  Work note given.    Return to clinic or go to ED if symptoms worsen or persist. Indications for ED discussed at length. Patient/Parent/Guardian voices understanding. Follow-up with your primary care provider in 3-5 days. Red flag symptoms discussed. All side effects of medication discussed including allergic response, GI upset, tendon injury, rash, sedation etc.    Please note that this dictation was created using voice recognition software. I have made every  reasonable attempt to correct obvious errors, but I expect that there are errors of grammar and possibly content that I did not discover before finalizing the note.

## 2023-03-29 NOTE — LETTER
March 29, 2023         Patient: Hesham Rich   YOB: 1990   Date of Visit: 3/29/2023           To Whom it May Concern:    Hesham Rich was seen in my clinic on 3/29/2023. He may return to work on 03/30/2023.    If you have any questions or concerns, please don't hesitate to call.        Sincerely,           AVRIL Olivares.  Electronically Signed